# Patient Record
Sex: FEMALE | Race: WHITE | NOT HISPANIC OR LATINO | Employment: FULL TIME | URBAN - METROPOLITAN AREA
[De-identification: names, ages, dates, MRNs, and addresses within clinical notes are randomized per-mention and may not be internally consistent; named-entity substitution may affect disease eponyms.]

---

## 2025-03-27 ENCOUNTER — HOSPITAL ENCOUNTER (EMERGENCY)
Facility: HOSPITAL | Age: 40
Discharge: HOME | End: 2025-03-27
Attending: EMERGENCY MEDICINE
Payer: COMMERCIAL

## 2025-03-27 ENCOUNTER — APPOINTMENT (OUTPATIENT)
Dept: GENERAL RADIOLOGY | Facility: HOSPITAL | Age: 40
End: 2025-03-27
Payer: COMMERCIAL

## 2025-03-27 VITALS
OXYGEN SATURATION: 98 % | HEART RATE: 100 BPM | RESPIRATION RATE: 16 BRPM | WEIGHT: 230 LBS | SYSTOLIC BLOOD PRESSURE: 139 MMHG | BODY MASS INDEX: 36.1 KG/M2 | TEMPERATURE: 98.9 F | HEIGHT: 67 IN | DIASTOLIC BLOOD PRESSURE: 92 MMHG

## 2025-03-27 DIAGNOSIS — M24.00 INTRA-ARTICULAR LOOSE BODY: ICD-10-CM

## 2025-03-27 DIAGNOSIS — M17.10 ARTHRITIS OF KNEE: ICD-10-CM

## 2025-03-27 DIAGNOSIS — M25.562 ACUTE PAIN OF LEFT KNEE: Primary | ICD-10-CM

## 2025-03-27 PROCEDURE — 73560 X-RAY EXAM OF KNEE 1 OR 2: CPT

## 2025-03-27 PROCEDURE — 99283 EMERGENCY DEPT VISIT LOW MDM: CPT

## 2025-03-27 NOTE — ED PROVIDER NOTES
Subjective   History of Present Illness  Pt is a 38 yo female presenting to ED with complaints of knee pain. PMHx significant for prior substance abuse and arthritis. Pt explain yesterday stepped wrong and twisted left knee. Denies actually falling onto knee. She has chronic pain in bilateral knees but worse since yesterday. Pt denies redness or discoloration to knee. She has increased swelling behind knee. She denies numbness or weakness to LE. She has been seen by [facility] for right knee but reports she prefers evaluation by a different orthopedic. Denies any other injuries. No prior hx of knee surgery. Pt uses tobacco and denies ETOH or drug use. Pt currently living in sober living.     History provided by:  Patient and medical records      Review of Systems   Constitutional:  Negative for fever.   Musculoskeletal:  Positive for arthralgias and joint swelling.   Skin:  Negative for color change and wound.   Neurological:  Negative for weakness and numbness.       Past Medical History:   Diagnosis Date    Substance abuse        No Known Allergies    Past Surgical History:   Procedure Laterality Date     SECTION         History reviewed. No pertinent family history.    Social History     Socioeconomic History    Marital status:    Tobacco Use    Smoking status: Every Day     Current packs/day: 1.00     Types: Cigarettes   Substance and Sexual Activity    Alcohol use: No    Drug use: Yes     Frequency: 7.0 times per week     Types: IV     Comment: heroine and anything else she can get a hold of     Sexual activity: Defer           Objective   Physical Exam  Vitals and nursing note reviewed.   Constitutional:       General: She is not in acute distress.  HENT:      Head: Atraumatic.   Eyes:      Conjunctiva/sclera: Conjunctivae normal.   Cardiovascular:      Rate and Rhythm: Normal rate.      Pulses: Normal pulses.   Pulmonary:      Effort: Pulmonary effort is normal. No respiratory distress.    Musculoskeletal:         General: Normal range of motion.      Cervical back: Normal range of motion and neck supple.      Left knee: Swelling present. No deformity, erythema or ecchymosis. Normal range of motion. Tenderness (posterior) present. Normal pulse.   Skin:     General: Skin is warm.   Neurological:      General: No focal deficit present.      Mental Status: She is alert and oriented to person, place, and time.      Sensory: No sensory deficit.      Motor: No weakness.   Psychiatric:         Mood and Affect: Mood normal.         Behavior: Behavior normal.         Procedures           ED Course  ED Course as of 03/27/25 1600   Thu Mar 27, 2025   1540 I personally and independently reviewed left knee and agree with the radiology interpretation specifically no acute fracture and findings of arthritis and posterior joint body. No old images to review.   [RT]   1545 Discussed patient with Dr. Pruett who is agreeable with ED course and tx plan. Reviewed images and agreeable with outpatient f/u with Ortho.  [RT]   1557 Discussed results and tx plan with patient. She already has a knee brace and declined crutches. Discussed f/u with Ortho and placed referral. Encouraged f/u with PCP as well. Discussed ice and elevation.  [RT]      ED Course User Index  [RT] Mimi Nair, PA      No results found for this or any previous visit (from the past 24 hours).  Note: In addition to lab results from this visit, the labs listed above may include labs taken at another facility or during a different encounter within the last 24 hours. Please correlate lab times with ED admission and discharge times for further clarification of the services performed during this visit.    XR Knee 1 or 2 View Left   Final Result   Impression:   1.No acute osseous abnormality of the left knee.   2.Severe lateral and patellofemoral compartment degenerative joint disease.   3.Large 2.9 cm intra-articular joint body posteriorly.           "  Electronically Signed: Candelario Mohamud     3/27/2025 3:36 PM EDT     Workstation ID: [id number]        Vitals:    03/27/25 1502   BP: 139/92   BP Location: Left arm   Patient Position: Sitting   Pulse: 100   Resp: 16   Temp: 98.9 °F (37.2 °C)   TempSrc: Oral   SpO2: 98%   Weight: 104 kg (230 lb)   Height: 170.2 cm (67\")     Medications - No data to display  ECG/EMG Results (last 24 hours)       ** No results found for the last 24 hours. **          No orders to display                                                        Medical Decision Making  Pt is a 40 yo female presenting to ED with complaints of knee pain.  I had a discussion with the patient / family regarding ED course, diagnosis, diagnostic results and treatment plan including medications and admission / discharge. Discussed if new or worse symptoms / concerns to return to ED for further evaluation. Discussed need for close follow up with PCP / specialists.     DDx  Fracture, Sprain, torn ligament, contusion, foreign body    Problems Addressed:  Acute pain of left knee: complicated acute illness or injury  Arthritis of knee: complicated acute illness or injury  Intra-articular loose body: complicated acute illness or injury    Amount and/or Complexity of Data Reviewed  External Data Reviewed: notes.     Details: Reviewed previous non ED visits including prior labs, imaging, available notes, medications, allergies and surgical hx.   2-25-25  [facility] for right [facility]  Radiology: ordered and independent interpretation performed. Decision-making details documented in ED Course.        Final diagnoses:   Acute pain of left knee   Intra-articular loose body   Arthritis of knee       ED Disposition  ED Disposition       ED Disposition   Discharge    Condition   Stable    Comment   --               PATIENT CONNECTION - [facility] [id number]  [phone]  Schedule an appointment as soon as possible for a visit       [facility] " [place] EMERGENCY DEPARTMENT  [address]  [facility] [id number] [phone]    If symptoms worsen    Sid Reaves MD  [address] [facility] [id number] [phone]    Schedule an appointment as soon as possible for a visit            Medication List      No changes were made to your prescriptions during this visit.            Mimi Nair PA  03/27/25 1600